# Patient Record
Sex: MALE | Race: WHITE | Employment: FULL TIME | URBAN - METROPOLITAN AREA
[De-identification: names, ages, dates, MRNs, and addresses within clinical notes are randomized per-mention and may not be internally consistent; named-entity substitution may affect disease eponyms.]

---

## 2017-01-23 ENCOUNTER — OFFICE VISIT (OUTPATIENT)
Dept: FAMILY MEDICINE CLINIC | Facility: CLINIC | Age: 47
End: 2017-01-23

## 2017-01-23 VITALS
BODY MASS INDEX: 28.56 KG/M2 | OXYGEN SATURATION: 99 % | SYSTOLIC BLOOD PRESSURE: 104 MMHG | DIASTOLIC BLOOD PRESSURE: 58 MMHG | TEMPERATURE: 98.3 F | WEIGHT: 260 LBS | HEART RATE: 50 BPM

## 2017-01-23 DIAGNOSIS — M25.561 CHRONIC PAIN OF RIGHT KNEE: Primary | ICD-10-CM

## 2017-01-23 DIAGNOSIS — G89.29 CHRONIC PAIN OF RIGHT KNEE: Primary | ICD-10-CM

## 2017-01-23 DIAGNOSIS — M54.2 NECK PAIN: ICD-10-CM

## 2017-01-23 PROCEDURE — 99213 OFFICE O/P EST LOW 20 MIN: CPT | Performed by: FAMILY MEDICINE

## 2017-01-23 RX ORDER — TIZANIDINE HYDROCHLORIDE 4 MG/1
4 CAPSULE, GELATIN COATED ORAL NIGHTLY PRN
Qty: 30 CAPSULE | Refills: 2 | Status: SHIPPED | OUTPATIENT
Start: 2017-01-23

## 2017-01-23 RX ORDER — PREDNISONE 10 MG/1
TABLET ORAL
Qty: 32 TABLET | Refills: 0 | Status: SHIPPED | OUTPATIENT
Start: 2017-01-23 | End: 2017-04-17

## 2017-01-23 NOTE — MR AVS SNAPSHOT
Froylan REINIER Jaxon   1/23/2017 8:45 AM   Office Visit    Provider:  Maldonado Stephens DO   Department:  [facility]   Dept Phone:  [phone]                Your Full Care Plan              Today's Medication Changes          These changes are accurate as of: 1/23/17  9:38 AM.  If you have any questions, ask your nurse or doctor.               New Medication(s)Ordered:     predniSONE 10 MG tablet   Commonly known as:  DELTASONE   6 tabs po qd x 2 days, 4 tabs po qd x 2 days, 3 tabs po qd x 2 days, 2 tabs po qd x 2 days, 1 tab po qd x 2 days   Started by:  Maldonado Stephens DO       TiZANidine 4 MG capsule   Commonly known as:  ZANAFLEX   Take 1 capsule by mouth At Night As Needed for muscle spasms.   Started by:  Maldonado Stephens DO         Stop taking medication(s)listed here:     cyclobenzaprine 10 MG tablet   Commonly known as:  FLEXERIL   Stopped by:  Maldonado Stephens DO                Where to Get Your Medications      These medications were sent to [address] [address] [facility] [phone] [facility] [phone]   [address] [address] [id number]     Phone:  [phone]     predniSONE 10 MG tablet    TiZANidine 4 MG capsule                  Your Updated Medication List          This list is accurate as of: 1/23/17  9:38 AM.  Always use your most recent med list.                escitalopram 10 MG tablet   Commonly known as:  LEXAPRO   Take 1 tablet by mouth daily.       naproxen 500 MG tablet   Commonly known as:  NAPROSYN       omeprazole 40 MG capsule   Commonly known as:  priLOSEC   TAKE ONE CAPSULE BY MOUTH DAILY       predniSONE 10 MG tablet   Commonly known as:  DELTASONE   6 tabs po qd x 2 days, 4 tabs po qd x 2 days, 3 tabs po qd x 2 days, 2 tabs po qd x 2 days, 1 tab po qd x 2 days       raNITIdine 300 MG tablet   Commonly known as:  ZANTAC   TAKE ONE TABLET BY MOUTH EVERY NIGHT AT BEDTIME       rOPINIRole 1 MG  tablet   Commonly known as:  REQUIP   Take 1 tablet by mouth every night. Take 1 hour before bedtime.       TiZANidine 4 MG capsule   Commonly known as:  ZANAFLEX   Take 1 capsule by mouth At Night As Needed for muscle spasms.               We Performed the Following     Ambulatory Referral to Orthopedic Surgery       You Were Diagnosed With        Codes Comments    Chronic pain of right knee    -  Primary ICD-10-CM: M25.561, G89.29  ICD-9-CM: 719.46, 338.29     Neck pain     ICD-10-CM: M54.2  ICD-9-CM: 723.1       Instructions     None    Patient Instructions History      [email or web address] Signup     [facility] [employer] allows you to send messages to your doctor, view your test results, renew your prescriptions, schedule appointments, and more. To sign up, go to [employer] and click on the Sign Up Now link in the New User? box. Enter your [employer] Activation Code exactly as it appears below along with the last four digits of your Social Security Number and your Date of Birth () to complete the sign-up process. If you do not sign up before the expiration date, you must request a new code.    [employer] Activation Code: [email or web address]  Expires: 2017  5:35 AM    If you have questions, you can email [email or web address] or call [phone] to talk to our [employer] staff. Remember, [employer] is NOT to be used for urgent needs. For medical emergencies, dial 911.               Other Info from Your Visit           Your Appointments     2017  8:00 AM EDT   Follow Up with Maldonado Stephens DO   [facility] (--)    [address] [id number] [phone]           Arrive 15 minutes prior to appointment.              Allergies     Penicillins  Other (See Comments)    Family history of severe reactions, so patient does not take it      Reason for Visit     Follow-up neck and rt shoulder pain and rt knee pain with no injury.      Vital Signs      Blood Pressure Pulse Temperature Weight Oxygen Saturation Body Mass Index    104/58 50 98.3 °F (36.8 °C) 260 lb (118 kg) 99% 28.56 kg/m2    Smoking Status                   Never Smoker           Problems and Diagnoses Noted     Neck pain    Chronic pain of right knee    -  Primary

## 2017-01-23 NOTE — PROGRESS NOTES
Subjective   Froylan Coates is a 46 y.o. male. Presents today for   Chief Complaint   Patient presents with   • Follow-up     neck and rt shoulder pain and rt knee pain with no injury.       Upper Extremity Issue   This is a new (Right neck/shoulder;  Off/on spasms neck radiated down shoudler/upper arm.) problem. The current episode started more than 1 year ago. The problem occurs intermittently. The problem has been waxing and waning. Associated symptoms include myalgias and neck pain. Pertinent negatives include no numbness. Associated symptoms comments: No injuries aware of.. Exacerbated by: sleeping on side, certain positions and activity. He has tried NSAIDs for the symptoms. The treatment provided mild relief.   Knee Pain    Incident onset: Right knee, hx of meniscal tear;  Was just medically managing until pain worsened.  No new injuries.  Feels pops frequently.  Pain ongoing several months. The incident occurred at home (No new injuries;  Remote history of hyperextension injury 7-8 years ago.). The injury mechanism was an eversion injury. The pain is present in the right knee. The quality of the pain is described as aching. The pain is moderate. The pain has been intermittent (Wakes up at night now) since onset. Pertinent negatives include no numbness or tingling. The symptoms are aggravated by weight bearing and movement. He has tried NSAIDs (Naproxen at night.) for the symptoms. The treatment provided mild relief.       Review of Systems   Musculoskeletal: Positive for myalgias and neck pain.   Neurological: Negative for tingling and numbness.       The following portions of the patient's history were reviewed and updated as appropriate: allergies, current medications, past medical history and problem list.    Patient Active Problem List   Diagnosis   • Acute low back pain   • Anemia   • Carpal tunnel syndrome   • Gastroesophageal reflux disease with esophagitis   • Gastroesophageal reflux disease  without esophagitis   • Lateral epicondylitis   • Neck pain   • Restless legs syndrome   • Sore throat   • Anxiety       Allergies   Allergen Reactions   • Penicillins Other (See Comments)     Family history of severe reactions, so patient does not take it       Current Outpatient Prescriptions on File Prior to Visit   Medication Sig Dispense Refill   • escitalopram (LEXAPRO) 10 MG tablet Take 1 tablet by mouth daily. 30 tablet 11   • naproxen (NAPROSYN) 500 MG tablet      • omeprazole (priLOSEC) 40 MG capsule TAKE ONE CAPSULE BY MOUTH DAILY 30 capsule 7   • ranitidine (ZANTAC) 300 MG tablet TAKE ONE TABLET BY MOUTH EVERY NIGHT AT BEDTIME 30 tablet 11   • rOPINIRole (REQUIP) 1 MG tablet Take 1 tablet by mouth every night. Take 1 hour before bedtime. 30 tablet 11   • [DISCONTINUED] cyclobenzaprine (FLEXERIL) 10 MG tablet Take 1 tablet by mouth nightly as needed.       No current facility-administered medications on file prior to visit.        Objective   Vitals:    01/23/17 0901   BP: 104/58   Pulse: 50   Temp: 98.3 °F (36.8 °C)   SpO2: 99%   Weight: 260 lb (118 kg)       Physical Exam   Constitutional: He appears well-developed and well-nourished.   Neck: Neck supple.   Musculoskeletal:        Right shoulder: He exhibits tenderness. He exhibits normal range of motion, no bony tenderness, no swelling and no effusion.        Right knee: He exhibits normal range of motion, no swelling, no effusion, no deformity, no LCL laxity, normal patellar mobility, no bony tenderness, normal meniscus and no MCL laxity. No tenderness found. No medial joint line, no lateral joint line, no MCL and no LCL tenderness noted.        Cervical back: He exhibits tenderness, pain and spasm. He exhibits normal range of motion, no bony tenderness, no swelling, no deformity and normal pulse.        Right hand: Normal strength noted. He exhibits no finger abduction, no thumb/finger opposition and no wrist extension trouble.        Left hand:  Normal strength noted. He exhibits no finger abduction, no thumb/finger opposition and no wrist extension trouble.   Negative McMurrays.   Neurological: He is alert.   Skin: Skin is warm and dry.   Psychiatric: He has a normal mood and affect. His behavior is normal.   Nursing note and vitals reviewed.      Assessment/Plan   Froylan was seen today for follow-up.    Diagnoses and all orders for this visit:    Chronic pain of right knee  -     predniSONE (DELTASONE) 10 MG tablet; 6 tabs po qd x 2 days, 4 tabs po qd x 2 days, 3 tabs po qd x 2 days, 2 tabs po qd x 2 days, 1 tab po qd x 2 days  -     Ambulatory Referral to Orthopedic Surgery    Neck pain  -     predniSONE (DELTASONE) 10 MG tablet; 6 tabs po qd x 2 days, 4 tabs po qd x 2 days, 3 tabs po qd x 2 days, 2 tabs po qd x 2 days, 1 tab po qd x 2 days  -     TiZANidine (ZANAFLEX) 4 MG capsule; Take 1 capsule by mouth At Night As Needed for muscle spasms.  -     Ambulatory Referral to Orthopedic Surgery    -will give steroid course.  Change muscle relaxer as not tolerating flexeril per patient and refer back to ortho for knee and have evaluate neck.  (Neck most likely muscle tension).           -Follow up: Prn - RTC if worse or no improvement.          Current Outpatient Prescriptions:   •  escitalopram (LEXAPRO) 10 MG tablet, Take 1 tablet by mouth daily., Disp: 30 tablet, Rfl: 11  •  naproxen (NAPROSYN) 500 MG tablet, , Disp: , Rfl:   •  omeprazole (priLOSEC) 40 MG capsule, TAKE ONE CAPSULE BY MOUTH DAILY, Disp: 30 capsule, Rfl: 7  •  ranitidine (ZANTAC) 300 MG tablet, TAKE ONE TABLET BY MOUTH EVERY NIGHT AT BEDTIME, Disp: 30 tablet, Rfl: 11  •  rOPINIRole (REQUIP) 1 MG tablet, Take 1 tablet by mouth every night. Take 1 hour before bedtime., Disp: 30 tablet, Rfl: 11  •  predniSONE (DELTASONE) 10 MG tablet, 6 tabs po qd x 2 days, 4 tabs po qd x 2 days, 3 tabs po qd x 2 days, 2 tabs po qd x 2 days, 1 tab po qd x 2 days, Disp: 32 tablet, Rfl: 0  •  TiZANidine  (ZANAFLEX) 4 MG capsule, Take 1 capsule by mouth At Night As Needed for muscle spasms., Disp: 30 capsule, Rfl: 2

## 2017-02-02 DIAGNOSIS — G25.81 RLS (RESTLESS LEGS SYNDROME): ICD-10-CM

## 2017-02-02 RX ORDER — RANITIDINE 300 MG/1
300 TABLET ORAL NIGHTLY
Qty: 90 TABLET | Refills: 2 | Status: SHIPPED | OUTPATIENT
Start: 2017-02-02 | End: 2017-06-28 | Stop reason: SDUPTHER

## 2017-02-02 RX ORDER — ROPINIROLE 1 MG/1
1 TABLET, FILM COATED ORAL NIGHTLY
Qty: 90 TABLET | Refills: 1 | Status: SHIPPED | OUTPATIENT
Start: 2017-02-02 | End: 2017-07-01 | Stop reason: SDUPTHER

## 2017-03-03 ENCOUNTER — TELEPHONE (OUTPATIENT)
Dept: FAMILY MEDICINE CLINIC | Facility: CLINIC | Age: 47
End: 2017-03-03

## 2017-03-03 DIAGNOSIS — M54.2 NECK PAIN: ICD-10-CM

## 2017-03-03 RX ORDER — TIZANIDINE HYDROCHLORIDE 4 MG/1
4 CAPSULE, GELATIN COATED ORAL NIGHTLY PRN
Qty: 30 CAPSULE | Refills: 2 | Status: CANCELLED | OUTPATIENT
Start: 2017-03-03

## 2017-03-03 RX ORDER — ESCITALOPRAM OXALATE 10 MG/1
10 TABLET ORAL DAILY
Qty: 90 TABLET | Refills: 1 | Status: SHIPPED | OUTPATIENT
Start: 2017-03-03

## 2017-03-03 RX ORDER — TIZANIDINE 4 MG/1
4 TABLET ORAL NIGHTLY PRN
Qty: 90 TABLET | Refills: 1 | Status: SHIPPED | OUTPATIENT
Start: 2017-03-03

## 2017-03-03 RX ORDER — NAPROXEN 500 MG/1
500 TABLET ORAL 2 TIMES DAILY WITH MEALS
Qty: 180 TABLET | Refills: 1 | Status: SHIPPED | OUTPATIENT
Start: 2017-03-03 | End: 2017-07-29 | Stop reason: SDUPTHER

## 2017-04-06 ENCOUNTER — HOSPITAL ENCOUNTER (OUTPATIENT)
Dept: HOSPITAL 23 - CMRI | Age: 47
Discharge: HOME | End: 2017-04-06
Payer: COMMERCIAL

## 2017-04-06 DIAGNOSIS — M25.461: ICD-10-CM

## 2017-04-06 DIAGNOSIS — M22.41: ICD-10-CM

## 2017-04-06 DIAGNOSIS — M25.561: Primary | ICD-10-CM

## 2017-06-28 RX ORDER — RANITIDINE 300 MG/1
TABLET ORAL
Qty: 30 TABLET | Refills: 10 | Status: SHIPPED | OUTPATIENT
Start: 2017-06-28

## 2017-07-01 DIAGNOSIS — G25.81 RLS (RESTLESS LEGS SYNDROME): ICD-10-CM

## 2017-07-03 RX ORDER — ROPINIROLE 1 MG/1
TABLET, FILM COATED ORAL
Qty: 90 TABLET | Refills: 0 | Status: SHIPPED | OUTPATIENT
Start: 2017-07-03 | End: 2017-09-21 | Stop reason: SDUPTHER

## 2017-07-31 RX ORDER — NAPROXEN 500 MG/1
TABLET ORAL
Qty: 180 TABLET | Refills: 0 | Status: SHIPPED | OUTPATIENT
Start: 2017-07-31 | End: 2017-10-19 | Stop reason: SDUPTHER

## 2017-09-21 DIAGNOSIS — G25.81 RLS (RESTLESS LEGS SYNDROME): ICD-10-CM

## 2017-09-22 RX ORDER — ROPINIROLE 1 MG/1
TABLET, FILM COATED ORAL
Qty: 90 TABLET | Refills: 0 | Status: SHIPPED | OUTPATIENT
Start: 2017-09-22 | End: 2017-12-11 | Stop reason: SDUPTHER

## 2017-10-18 RX ORDER — OMEPRAZOLE 40 MG/1
CAPSULE, DELAYED RELEASE ORAL
Qty: 30 CAPSULE | Refills: 2 | Status: SHIPPED | OUTPATIENT
Start: 2017-10-18

## 2017-10-20 RX ORDER — NAPROXEN 500 MG/1
TABLET ORAL
Qty: 180 TABLET | Refills: 1 | Status: SHIPPED | OUTPATIENT
Start: 2017-10-20

## 2017-12-11 DIAGNOSIS — G25.81 RLS (RESTLESS LEGS SYNDROME): ICD-10-CM

## 2017-12-12 RX ORDER — ROPINIROLE 1 MG/1
1 TABLET, FILM COATED ORAL NIGHTLY
Qty: 90 TABLET | Refills: 0 | Status: SHIPPED | OUTPATIENT
Start: 2017-12-12